# Patient Record
Sex: FEMALE | Race: WHITE | URBAN - METROPOLITAN AREA
[De-identification: names, ages, dates, MRNs, and addresses within clinical notes are randomized per-mention and may not be internally consistent; named-entity substitution may affect disease eponyms.]

---

## 2017-11-07 ENCOUNTER — OFFICE VISIT (OUTPATIENT)
Dept: INFECTIOUS DISEASES | Age: 82
End: 2017-11-07
Payer: MEDICARE

## 2017-11-07 VITALS
HEIGHT: 65 IN | TEMPERATURE: 97.1 F | BODY MASS INDEX: 20.16 KG/M2 | SYSTOLIC BLOOD PRESSURE: 107 MMHG | DIASTOLIC BLOOD PRESSURE: 73 MMHG | WEIGHT: 121 LBS | HEART RATE: 77 BPM

## 2017-11-07 DIAGNOSIS — B95.61 BACTEREMIA DUE TO STAPHYLOCOCCUS AUREUS WITHOUT SEPSIS: Primary | ICD-10-CM

## 2017-11-07 DIAGNOSIS — N39.0 E. COLI UTI: ICD-10-CM

## 2017-11-07 DIAGNOSIS — R78.81 BACTEREMIA DUE TO STAPHYLOCOCCUS AUREUS WITHOUT SEPSIS: Primary | ICD-10-CM

## 2017-11-07 DIAGNOSIS — B96.20 E. COLI UTI: ICD-10-CM

## 2017-11-07 PROCEDURE — 99214 OFFICE O/P EST MOD 30 MIN: CPT | Performed by: INTERNAL MEDICINE

## 2017-11-07 RX ORDER — METOPROLOL SUCCINATE AND HYDROCHLOROTHIAZIDE 25; 12.5 MG/1; MG/1
TABLET ORAL
COMMUNITY

## 2017-11-07 ASSESSMENT — ENCOUNTER SYMPTOMS
COLOR CHANGE: 0
TROUBLE SWALLOWING: 0
SHORTNESS OF BREATH: 0
NAUSEA: 0
SORE THROAT: 0
ABDOMINAL DISTENTION: 0
BACK PAIN: 0
VOICE CHANGE: 0
SINUS PRESSURE: 0
RHINORRHEA: 0
COUGH: 0
ABDOMINAL PAIN: 0
FACIAL SWELLING: 0
ANAL BLEEDING: 0
EYE DISCHARGE: 0
DIARRHEA: 0

## 2017-11-07 NOTE — LETTER
speech difficulty, weakness, light-headedness, numbness and headaches. Hematological: Does not bruise/bleed easily. Psychiatric/Behavioral: Negative for agitation. Current Outpatient Prescriptions:     Metoprolol-HCTZ ER 25-12.5 MG TB24, Take by mouth, Disp: , Rfl:     atenolol (TENORMIN) 50 MG tablet, Take 50 mg by mouth daily, Disp: , Rfl:     CALCITRIOL PO, Take by mouth, Disp: , Rfl:     furosemide (LASIX) 40 MG tablet, Take 40 mg by mouth 2 times daily, Disp: , Rfl:     lisinopril (PRINIVIL;ZESTRIL) 10 MG tablet, Take 10 mg by mouth daily, Disp: , Rfl:     Potassium (POTASSIMIN PO), Take 20 mEq by mouth, Disp: , Rfl:     primidone (MYSOLINE) 50 MG tablet, Take 50 mg by mouth 3 times daily, Disp: , Rfl:     timolol (TIMOPTIC) 0.5 % ophthalmic solution, 1 drop 2 times daily, Disp: , Rfl:     levothyroxine (SYNTHROID) 25 MCG tablet, Take 25 mcg by mouth Daily, Disp: , Rfl:     Coenzyme Q10 (COQ-10 PO), Take by mouth, Disp: , Rfl:     latanoprost (XALATAN) 0.005 % ophthalmic solution, 1 drop nightly, Disp: , Rfl:     ALPRAZolam (XANAX) 0.25 MG tablet, Take 1 tablet by mouth 3 times daily as needed for Anxiety, Disp: 4 tablet, Rfl: 0    Past Medical History:   Diagnosis Date    Hypertension     Osteoporosis        History reviewed. No pertinent surgical history. Allergies   Allergen Reactions    Eggs Or Egg-Derived Products     Pcn [Penicillins]        History reviewed. No pertinent family history. Social History     Social History    Marital status:       Spouse name: N/A    Number of children: N/A    Years of education: N/A     Social History Main Topics    Smoking status: Never Smoker    Smokeless tobacco: Never Used    Alcohol use No    Drug use: No    Sexual activity: Not Asked     Other Topics Concern    None     Social History Narrative    None         Objective:   /73   Pulse 77   Temp 97.1 °F (36.2 °C)   Ht 5' 5\" (1.651 m) Wt 121 lb (54.9 kg)   BMI 20.14 kg/m²    Physical Exam   Constitutional: She is oriented to person, place, and time. She appears well-developed and well-nourished. No distress. Alert on wheelchair, seen with daughter   ANDREW:   Head: Normocephalic. Nose: Nose normal.   Mouth/Throat: Oropharynx is clear and moist.   Eyes: No scleral icterus. Neck: Neck supple. No JVD present. No tracheal deviation present. Cardiovascular: Normal rate, regular rhythm, normal heart sounds and intact distal pulses. Exam reveals no friction rub. No murmur heard. Pulmonary/Chest: Effort normal and breath sounds normal. No stridor. Abdominal: Soft. Bowel sounds are normal. She exhibits no distension. There is no tenderness. Musculoskeletal: She exhibits no edema. Lymphadenopathy:     She has no cervical adenopathy. Neurological: She is alert and oriented to person, place, and time. Skin: She is not diaphoretic. Psychiatric: She has a normal mood and affect.  Her behavior is normal. Judgment and thought content normal.          Data Review:  10/10 WBC 7.8 hb 10.6 Platelet 355  37/42 Sodium 140 K 3.6 Cl 99 HCO3 31  BUN 35 creatinine 1.33  Glucose 86 calcium 8.3    MICRO:  URINE CULTURE (..)      SPECIMEN DESCRIPTION     CLEAN CATCH MIDSTREAM URINE                           Performed at [facility]                           [address] [place], [place] [address]     CULTURE RESULTS          >100,000 ORGANISMS/mL ESCHERICHIA COLI                           ALONG WITH FEW NORMAL URO GENITAL IMTIAZ                           Performed at [place] [address] [address], [address]     REPORT STATUS            09/28/2017 FINAL     ORGANISM                 >100,000 ORGANISMS/mL ESCHERICHIA COLI  METHOD                   JAQUELIN  AMPICILLIN               8 SUSCEPTIBLE  AMP/SULBACTAM            <=2/1 SUSCEPTIBLE  CEFAZOLIN                <=4 SUSCEPTIBLE

## 2017-11-07 NOTE — PROGRESS NOTES
20 mEq by mouth, Disp: , Rfl:     primidone (MYSOLINE) 50 MG tablet, Take 50 mg by mouth 3 times daily, Disp: , Rfl:     timolol (TIMOPTIC) 0.5 % ophthalmic solution, 1 drop 2 times daily, Disp: , Rfl:     levothyroxine (SYNTHROID) 25 MCG tablet, Take 25 mcg by mouth Daily, Disp: , Rfl:     Coenzyme Q10 (COQ-10 PO), Take by mouth, Disp: , Rfl:     latanoprost (XALATAN) 0.005 % ophthalmic solution, 1 drop nightly, Disp: , Rfl:     ALPRAZolam (XANAX) 0.25 MG tablet, Take 1 tablet by mouth 3 times daily as needed for Anxiety, Disp: 4 tablet, Rfl: 0    Past Medical History:   Diagnosis Date    Hypertension     Osteoporosis        History reviewed. No pertinent surgical history. Allergies   Allergen Reactions    Eggs Or Egg-Derived Products     Pcn [Penicillins]        History reviewed. No pertinent family history. Social History     Social History    Marital status:      Spouse name: N/A    Number of children: N/A    Years of education: N/A     Social History Main Topics    Smoking status: Never Smoker    Smokeless tobacco: Never Used    Alcohol use No    Drug use: No    Sexual activity: Not Asked     Other Topics Concern    None     Social History Narrative    None         Objective:   /73   Pulse 77   Temp 97.1 °F (36.2 °C)   Ht 5' 5\" (1.651 m)   Wt 121 lb (54.9 kg)   BMI 20.14 kg/m²   Physical Exam   Constitutional: She is oriented to person, place, and time. She appears well-developed and well-nourished. No distress. Alert on wheelchair, seen with daughter   HENT:   Head: Normocephalic. Nose: Nose normal.   Mouth/Throat: Oropharynx is clear and moist.   Eyes: No scleral icterus. Neck: Neck supple. No JVD present. No tracheal deviation present. Cardiovascular: Normal rate, regular rhythm, normal heart sounds and intact distal pulses. Exam reveals no friction rub. No murmur heard. Pulmonary/Chest: Effort normal and breath sounds normal. No stridor.    Abdominal: ACTINOMYCES ODONTOLYTICUS                           Staphylcoccus aureus detected by PCR, mecA not                           detected                           Performed at [facility], [address], [place] [address]     REPORT STATUS            10/03/2017 FINAL     ORGANISM                 STAPHYLOCOCCUS AUREUS  METHOD                   JAQUELIN  CEFAZOLIN                SUSCEPTIBLE(DEDUCED)  CLINDAMYCIN              0.25 SUSCEPTIBLE  ERYTHROMYCIN             <=0.25 SUSCEPTIBLE  GENTAMICIN               <=0.5 SUSCEPTIBLE  OXACILLIN                <=0.25 SUSCEPTIBLE  TRIMETH/SULFAMETHOXAZOLE <=.5/9.5 SUSCEPTIBLE  VANCOMYCIN               <=0.5 SUSCEPTIBLE  DOXYCYCLINE              <=0.5 SUSCEPTIBLE     OBSERVATION DATE:        10/03/2017 12:00   Other Clinicians who have viewed this Result in Portal or Rounding     Check 10 on Selected Components for BLOOD CULTURE     TEST: BLOOD CULTURE       Collected Date & Time: 09/26/17 18:00            Result Name Results Units Reference Range     BLOOD CULTURE  (..)           SPECIMEN DESCRIPTION     BLOOD                           Performed at [address] [place] [address], [address] [facility]     CULTURE RESULTS          STREPTOCOCCUS VIRIDANS GROUP POSSIBLE COLLECTION                           CONTAMINATION. SINGLE POSITIVE CULTURE IS OF UNCERTAIN CLINICAL                           SIGNIFICANCE. SUGGEST CONTINUED MONITORING OF                           REMAINING BLOOD CULTURES. CONTACT MICROBIOLOGY                           IF FURTHER INFORMATION IS REQUIRED.                            Streptococcus species detected by PCR (not S.                           agalactiae, S. pneumoniae or S. pyogenes)                           Performed at [facility], [address], [place] [address] [facility]

## 2017-11-08 ENCOUNTER — HOSPITAL ENCOUNTER (OUTPATIENT)
Age: 82
Setting detail: SPECIMEN
Discharge: HOME | End: 2017-11-08
Payer: MEDICARE

## 2017-11-08 LAB
ANION GAP SERPL CALCULATED.3IONS-SCNC: 16 MMOL/L (ref 9–17)
BUN BLDV-MCNC: 56 MG/DL (ref 8–23)
BUN/CREAT BLD: ABNORMAL (ref 9–20)
CALCIUM SERPL-MCNC: 8.6 MG/DL (ref 8.6–10.4)
CHLORIDE BLD-SCNC: 97 MMOL/L (ref 98–107)
CO2: 23 MMOL/L (ref 20–31)
CREAT SERPL-MCNC: 2.2 MG/DL (ref 0.5–0.9)
GFR AFRICAN AMERICAN: 25 ML/MIN
GFR NON-AFRICAN AMERICAN: 21 ML/MIN
GFR SERPL CREATININE-BSD FRML MDRD: ABNORMAL ML/MIN/{1.73_M2}
GFR SERPL CREATININE-BSD FRML MDRD: ABNORMAL ML/MIN/{1.73_M2}
GLUCOSE BLD-MCNC: 112 MG/DL (ref 70–99)
POTASSIUM SERPL-SCNC: 3.7 MMOL/L (ref 3.7–5.3)
SODIUM BLD-SCNC: 136 MMOL/L (ref 135–144)